# Patient Record
Sex: FEMALE | Race: WHITE | NOT HISPANIC OR LATINO | Employment: OTHER | URBAN - METROPOLITAN AREA
[De-identification: names, ages, dates, MRNs, and addresses within clinical notes are randomized per-mention and may not be internally consistent; named-entity substitution may affect disease eponyms.]

---

## 2024-08-23 ENCOUNTER — HOSPITAL ENCOUNTER (EMERGENCY)
Facility: CLINIC | Age: 74
Discharge: HOME | End: 2024-08-23
Attending: EMERGENCY MEDICINE | Admitting: EMERGENCY MEDICINE
Payer: COMMERCIAL

## 2024-08-23 VITALS
TEMPERATURE: 99.6 F | DIASTOLIC BLOOD PRESSURE: 96 MMHG | OXYGEN SATURATION: 97 % | RESPIRATION RATE: 18 BRPM | HEIGHT: 62 IN | SYSTOLIC BLOOD PRESSURE: 187 MMHG | BODY MASS INDEX: 23.92 KG/M2 | WEIGHT: 130 LBS | HEART RATE: 95 BPM

## 2024-08-23 DIAGNOSIS — K57.92 ACUTE DIVERTICULITIS: ICD-10-CM

## 2024-08-23 PROCEDURE — 99283 EMERGENCY DEPT VISIT LOW MDM: CPT

## 2024-08-23 PROCEDURE — 250N000013 HC RX MED GY IP 250 OP 250 PS 637: Performed by: EMERGENCY MEDICINE

## 2024-08-23 RX ADMIN — AMOXICILLIN AND CLAVULANATE POTASSIUM 1 TABLET: 875; 125 TABLET, FILM COATED ORAL at 21:53

## 2024-08-23 ASSESSMENT — COLUMBIA-SUICIDE SEVERITY RATING SCALE - C-SSRS
2. HAVE YOU ACTUALLY HAD ANY THOUGHTS OF KILLING YOURSELF IN THE PAST MONTH?: NO
6. HAVE YOU EVER DONE ANYTHING, STARTED TO DO ANYTHING, OR PREPARED TO DO ANYTHING TO END YOUR LIFE?: NO
1. IN THE PAST MONTH, HAVE YOU WISHED YOU WERE DEAD OR WISHED YOU COULD GO TO SLEEP AND NOT WAKE UP?: NO

## 2024-08-24 NOTE — ED TRIAGE NOTES
Patient arrives to the ER with c/o abdominal pain. Has a history of diverticulitis, and states this feels like that.      Triage Assessment (Adult)       Row Name 08/23/24 5397          Triage Assessment    Airway WDL WDL        Respiratory WDL    Respiratory WDL WDL        Skin Circulation/Temperature WDL    Skin Circulation/Temperature WDL WDL        Cardiac WDL    Cardiac WDL WDL        Peripheral/Neurovascular WDL    Peripheral Neurovascular WDL WDL        Cognitive/Neuro/Behavioral WDL    Cognitive/Neuro/Behavioral WDL WDL

## 2024-08-24 NOTE — ED PROVIDER NOTES
EMERGENCY DEPARTMENT ENCOUnter      NAME: Sharon Carrillo  AGE: 73 year old female  YOB: 1950  MRN: [id number]  EVALUATION DATE & TIME: No admission date for patient encounter.    PCP: Delmi Arroyo    ED PROVIDER: Aisha Joseph MD      No chief complaint on file.        FINAL IMPRESSION:  1. Acute diverticulitis          ED COURSE & MEDICAL DECISION MAKING:      In summary, the patient is a 73-year-old female who presents to the emergency department for evaluation of low abdominal pain thought secondary to diverticulitis.  The patient has had multiple episodes of diverticulitis and states she knows what it feels like.  She would prefer not to have an extensive workup in the emergency department and would like to try antibiotics, which is reasonable.  Recommended close outpatient follow-up with primary care.    2115-discussed evaluation with patient and she would like to decline labs and CT testing at this time.  She would like to try antibiotics to see if this helps her symptoms.  Augmentin 875 mg p.o. was administered.    Medical Decision Making  Obtained supplemental history:Supplemental history obtained?: No  Reviewed external records: External records reviewed?: No  Care impacted by chronic illness:Hypertension  Care significantly affected by social determinants of health:N/A  Did you consider but not order tests?: Work up considered but not performed and documented in chart, if applicable  Did you interpret images independently?: Independent interpretation of ECG and images noted in documentation, when applicable.  Consultation discussion with other provider:Did you involve another provider (consultant, MH, pharmacy, etc.)?: No  Discharge. I prescribed additional prescription strength medication(s) as charted. See documentation for any additional details.  No MIPS measures identified.        At the conclusion of the encounter I discussed the results of all of the tests and  the disposition. The questions were answered. The patient or family acknowledged understanding and was agreeable with the care plan.         MEDICATIONS GIVEN IN THE EMERGENCY:  Medications   amoxicillin-clavulanate (AUGMENTIN) 875-125 MG per tablet 1 tablet (has no administration in time range)       NEW PRESCRIPTIONS STARTED AT TODAY'S ER VISIT  New Prescriptions    AMOXICILLIN-CLAVULANATE (AUGMENTIN) 875-125 MG TABLET    Take 1 tablet by mouth 2 times daily for 7 days.          =================================================================    HPI        Sharon Carrillo is a 73 year old female with a pertinent history of diverticulitis presents to the emergency department for evaluation of low abdominal pain that started today.  She describes her pain as dull, 4 out of 10 in intensity, located in her low abdomen, and is not relieved or exacerbated by any particular activity.  She denies any nausea, vomiting, diarrhea, dysuria, urgency, or frequency.  She has not had any fevers or chills.      REVIEW OF SYSTEMS     Constitutional:  Denies fever or chills  HENT:  Denies sore throat   Respiratory:  Denies cough or shortness of breath   Cardiovascular:  Denies chest pain or palpitations  GI:  abdominal pain  Musculoskeletal:  Denies any new extremity pain   Skin:  Denies rash   Neurologic:  Denies headache, focal weakness or sensory changes    All other systems reviewed and are negative      PAST MEDICAL HISTORY:  Past Medical History:   Diagnosis Date    Breast cancer (H)     DJD (degenerative joint disease) of hip     Ganglion cyst     HTN (hypertension)     Hypercholesterolaemia        PAST SURGICAL HISTORY:  Past Surgical History:   Procedure Laterality Date    APPENDECTOMY      BREAST LUMPECTOMY, RT/LT      C TOTAL ABDOM HYSTERECTOMY      HC REMOVAL OF OVARIAN CYST(S)      REPAIR TENDON WRIST  5/28/2014    Procedure: REPAIR TENDON WRIST;  Surgeon: Delmi Clayton MD;  Location: [facility]     SALPINGO-OOPHORECTOMY BILATERAL             CURRENT MEDICATIONS:    amoxicillin-clavulanate (AUGMENTIN) 875-125 MG tablet  aspirin 81 MG EC tablet  Atenolol (TENORMIN PO)  Biotin 10 MG TABS  calcium carbonate (TUMS) 500 MG chewable tablet  glucosamine-chondroitin 500-400 MG CAPS  HYDROcodone-acetaminophen (NORCO) 5-325 MG per tablet  Multiple Vitamin (MULTIVITAMINS PO)  Omega-3 Fatty Acids (OMEGA-3 FISH OIL PO)  SIMVASTATIN PO  VITAMIN D, CHOLECALCIFEROL, PO        ALLERGIES:  Allergies   Allergen Reactions    Sulfa Antibiotics Hives       FAMILY HISTORY:  No family history on file.    SOCIAL HISTORY:   Social History     Socioeconomic History    Marital status:    Tobacco Use    Smoking status: Never   Substance and Sexual Activity    Alcohol use: Yes    Drug use: No     Social Determinants of Health     Financial Resource Strain: Low Risk  (6/26/2023)    Received from [facility]    Financial Resource Strain     Difficulty of Paying Living Expenses: 3   Food Insecurity: No Food Insecurity (6/26/2023)    Received from [facility]    Food Insecurity     Worried About Running Out of Food in the Last Year: 1   Transportation Needs: No Transportation Needs (6/26/2023)    Received from [facility]    Transportation Needs     Lack of Transportation (Medical): 1   Social Connections: Socially Integrated (6/26/2023)    Received from [facility]    Social Connections     Frequency of Communication with Friends and Family: 0   Housing Stability: Low Risk  (7/24/2023)    Received from [facility]    Housing Stability     Unable to Pay for Housing in the Last Year: 1         PHYSICAL EXAM    Constitutional:  Well developed, Well nourished,  HENT:  Normocephalic, Atraumatic, Bilateral external ears normal, Oropharynx moist, Nose normal.   Neck:  Normal range  of motion, No meningismus, No stridor.   Eyes:  EOMI, Conjunctiva normal, No discharge.   Respiratory:  Normal breath sounds, No respiratory distress, No wheezing, No chest tenderness.   Cardiovascular:  Normal heart rate, Normal rhythm, No murmurs  GI:  Soft, mild lop abdominal tenderness, No guarding,   Musculoskeletal:  Neurovascularly intact distally, No edema, No tenderness, No cyanosis, Good range of motion in all major joints.   Integument:  Warm, Dry, No erythema, No rash.   Lymphatic:  No lymphadenopathy noted.   Neurologic:  Alert & oriented , Normal motor function,  No focal deficits noted.   Psychiatric:  Affect normal, Judgment normal, Mood normal.        Aisha Joseph MD  Emergency Medicine  [facility] EMERGENCY ROOM  [address] [address] [phone]  Dept: [phone]     Aisha Joseph MD  08/23/24 0446

## 2024-08-24 NOTE — DISCHARGE INSTRUCTIONS
Tylenol 650 mg every 4 hours as needed for pain or fever  Ibuprofen 600 mg every 6 hours as needed for pain or fever  Dramamine as needed for nausea or vomiting  Return to the emergency department for worsening problems or concerns  Your next dose of antibiotics will be tomorrow morning

## 2024-09-15 ENCOUNTER — HOSPITAL ENCOUNTER (EMERGENCY)
Facility: CLINIC | Age: 74
Discharge: HOME | End: 2024-09-15
Attending: EMERGENCY MEDICINE | Admitting: EMERGENCY MEDICINE
Payer: COMMERCIAL

## 2024-09-15 VITALS
DIASTOLIC BLOOD PRESSURE: 88 MMHG | HEART RATE: 91 BPM | SYSTOLIC BLOOD PRESSURE: 191 MMHG | RESPIRATION RATE: 18 BRPM | OXYGEN SATURATION: 98 % | TEMPERATURE: 99.5 F

## 2024-09-15 DIAGNOSIS — K57.92 DIVERTICULITIS: ICD-10-CM

## 2024-09-15 DIAGNOSIS — I10 HYPERTENSION, UNSPECIFIED TYPE: ICD-10-CM

## 2024-09-15 PROBLEM — I82.402 DEEP VEIN THROMBOSIS (DVT) OF LEFT LOWER EXTREMITY (H): Status: ACTIVE | Noted: 2023-06-26

## 2024-09-15 PROBLEM — I25.10 NON-OCCLUSIVE CORONARY ARTERY DISEASE: Status: ACTIVE | Noted: 2023-06-26

## 2024-09-15 PROCEDURE — 250N000013 HC RX MED GY IP 250 OP 250 PS 637: Performed by: EMERGENCY MEDICINE

## 2024-09-15 PROCEDURE — 99283 EMERGENCY DEPT VISIT LOW MDM: CPT

## 2024-09-15 RX ADMIN — AMOXICILLIN AND CLAVULANATE POTASSIUM 1 TABLET: 875; 125 TABLET, FILM COATED ORAL at 20:31

## 2024-09-15 ASSESSMENT — COLUMBIA-SUICIDE SEVERITY RATING SCALE - C-SSRS
6. HAVE YOU EVER DONE ANYTHING, STARTED TO DO ANYTHING, OR PREPARED TO DO ANYTHING TO END YOUR LIFE?: NO
2. HAVE YOU ACTUALLY HAD ANY THOUGHTS OF KILLING YOURSELF IN THE PAST MONTH?: NO
1. IN THE PAST MONTH, HAVE YOU WISHED YOU WERE DEAD OR WISHED YOU COULD GO TO SLEEP AND NOT WAKE UP?: NO

## 2024-09-16 NOTE — ED TRIAGE NOTES
Pt presents to the ED with c/o lower abdominal pain. Pt reports hx of diverticulitis, states that this feels the same. Denies fevers at home. Last BM this morning. Denies N/V/D. Tim CARDONA assessed patient in triage.

## 2024-09-16 NOTE — DISCHARGE INSTRUCTIONS
Take antibiotics as prescribed.  Given recent Augmentin if you are not getting improvement would consider switching to Cipro/Flagyl.  Your blood pressure was elevated today, monitor blood pressure at home and follow-up with PCP for blood pressure check.

## 2024-09-16 NOTE — ED PROVIDER NOTES
EMERGENCY DEPARTMENT ENCOUNTER      NAME: Sharon Carrillo  AGE: 73 year old female  YOB: 1950  MRN: [id number]  EVALUATION DATE & TIME: 9/15/2024  8:20 PM    PCP: Delmi Arroyo    ED PROVIDER: Areli Nguyen MD    Chief Complaint   Patient presents with    Abdominal Pain         FINAL IMPRESSION:  1. Diverticulitis    2. Hypertension, unspecified type          ED COURSE & MEDICAL DECISION MAKING:    Pertinent Labs & Imaging studies reviewed. (See chart for details)  73 year old female with history of HTN, HLD, nonocclusive CAD, previous DVT and recurrent diverticulitis who presents to the Emergency Department for evaluation of mild lower abdominal discomfort that started this morning reminiscent of previous episodes of diverticulitis.  Abdomen is benign on my examination.  Patient has not had any fevers, chills, nausea vomiting.  Bowel movements have been normal and she has never had issues with complicated diverticulitis such as perforation, abscess, need for hospitalization, excetra previously.  Discussed imaging and laboratory testing to confirm diagnosis, but in lieu of this being very similar to her previous episodes of diverticulitis patient wishes to forego and lieu of antibiotic treatment alone which I think is reasonable.  Was recently on Augmentin within the last 3 to 4 weeks so discussed using Cipro and Flagyl but she seems to historically responded to Augmentin well so we will give her a longer course of Augmentin but encouraged to follow-up with PCP in 24 to 48 hours for consideration of adjusting antibiotic regimen if she is not getting relief in her symptoms.  Blood pressure elevated here, history of same.  States blood pressure is typically well-controlled when she checks it at home, but has some degree of whitecoat hypertension.  Will encouraged to check blood pressure at home and follow-up with PCP for blood pressure check.      ED Course as of 09/15/24 2100   Sun Sep 15,  2024 2011 I met with the patient, obtained history, performed an initial exam, and discussed options and plan for diagnostics and treatment here in the ED.     2030 We discussed the plan for discharge and the patient is agreeable. Reviewed supportive cares, symptomatic treatment, outpatient follow up, and reasons to return to the Emergency Department. Patient to be discharged by ED RN.          Medical Decision Making    History:  Supplemental history from: Documented in chart and N/A  External Record(s) reviewed: 8/29/2024 office visit    Work Up:  Chart documentation includes differential considered and any EKGs or imaging independently interpreted by provider, see MDM  In additional to work up documented, I considered the following work up: see MDM    External consultation:  Discussion of management with another provider: N/A    Complicating factors:  Care impacted by chronic illness: HTN, HLD, CAD  Care affected by social determinants of health: Access to Medical Care weekend no access PCP    Disposition considerations: Discharge. I prescribed additional prescription strength medication(s) as charted. See documentation for any additional details.    Not Applicable      At the conclusion of the encounter I discussed the results of all of the tests and the disposition. The questions were answered. The patient or family acknowledged understanding and was agreeable with the care plan.    MEDICATIONS GIVEN IN THE EMERGENCY:  Medications   amoxicillin-clavulanate (AUGMENTIN) 875-125 MG per tablet 1 tablet (1 tablet Oral $Given 9/15/24 2031)       NEW PRESCRIPTIONS STARTED AT TODAY'S ER VISIT  Discharge Medication List as of 9/15/2024  8:31 PM        START taking these medications    Details   amoxicillin-clavulanate (AUGMENTIN) 875-125 MG tablet Take 1 tablet by mouth 2 times daily for 14 days., Disp-28 tablet, R-0, Local Print             "    =================================================================    HPI    Patient information was obtained from: patient     Use of Intrepreter: N/A      Sharon Carrillo is a 73 year old female with pertinent medical history of hypertension, diverticulitis who presents abdominal pain.    Patient came to ED after feeling lower abdominal pain that she believes is related to her diverticulitis. She has a reported fever and has \"heavy feelings\" in her abdomen. She reports feeling very bloated and is passing gas. Her last bowel movement was this morning. She has had many episode like this in the past including three weeks ago when she came into the ED and was prescribed Augmentin for one week. Her last colonoscopy was last month. They found one benign polyp but otherwise unremarkable. She reports her blood pressure is usually around 120/70 but it gets higher when she is in hospitals. Patient denies any dysuria, hematuria, or history of complicated diverticulitis.    Patient wishes for her prescription to be sent to [facility] in [facility].     Per Chart Review: 8/23/24 [facility] ED visit for abdominal pain. Patient came in having abdominal pain that feels exactly like her diverticulitis acting up. Augmentin 875 mg was administered and she was discharged same day.      PAST MEDICAL HISTORY:  Past Medical History:   Diagnosis Date    Breast cancer (H)     DJD (degenerative joint disease) of hip     Ganglion cyst     HTN (hypertension)     Hypercholesterolaemia        PAST SURGICAL HISTORY:  Past Surgical History:   Procedure Laterality Date    APPENDECTOMY      BREAST LUMPECTOMY, RT/LT      C TOTAL ABDOM HYSTERECTOMY      HC REMOVAL OF OVARIAN CYST(S)      REPAIR TENDON WRIST  5/28/2014    Procedure: REPAIR TENDON WRIST;  Surgeon: Delmi Clayton MD;  Location: [facility]    SALPINGO-OOPHORECTOMY BILATERAL         CURRENT MEDICATIONS:    Prior to Admission Medications   Prescriptions Last Dose Informant " Patient Reported? Taking?   Atenolol (TENORMIN PO)   Yes No   Sig: Take 50 mg by mouth daily   Biotin 10 MG TABS   Yes No   Sig: Take 1 tablet by mouth daily   HYDROcodone-acetaminophen (NORCO) 5-325 MG per tablet   No No   Sig: Take 1-2 tablets every 4-6 hours for pain if needed.   Multiple Vitamin (MULTIVITAMINS PO)   Yes No   Sig: Take 1 tablet by mouth daily   Omega-3 Fatty Acids (OMEGA-3 FISH OIL PO)   Yes No   Sig: Take 1 g by mouth daily   SIMVASTATIN PO   Yes No   Sig: Take 10 mg by mouth At Bedtime    VITAMIN D, CHOLECALCIFEROL, PO   Yes No   Sig: Take 1,000 Units by mouth daily   aspirin 81 MG EC tablet   Yes No   Sig: Take 81 mg by mouth daily   calcium carbonate (TUMS) 500 MG chewable tablet   Yes No   Sig: Take 1 chew tab by mouth daily   glucosamine-chondroitin 500-400 MG CAPS   Yes No   Sig: Take 1 capsule by mouth daily      Facility-Administered Medications: None       ALLERGIES:  Allergies   Allergen Reactions    Sulfa Antibiotics Hives       FAMILY HISTORY:  No family history on file.    SOCIAL HISTORY:  Social History     Tobacco Use    Smoking status: Never   Substance Use Topics    Alcohol use: Yes    Drug use: No        VITALS:  Patient Vitals for the past 24 hrs:   BP Temp Temp src Pulse Resp SpO2   09/15/24 2031 (!) 191/88 -- -- -- -- --   09/15/24 2017 (!) 197/104 -- -- 91 -- 98 %   09/15/24 2016 (!) 223/112 -- -- -- -- --   09/15/24 2013 -- 99.5  F (37.5  C) Temporal 91 18 98 %       PHYSICAL EXAM    General Appearance: Well-appearing, well-nourished, no acute distress  Cardio:  Regular rate and rhythm,  hypertensive  Pulm:  No respiratory distress  Back: No CVA tenderness, normal ROM  Abdomen:  Soft, non-tender, non distended,no rebound or guarding, cannot reproduce pain.  Extremities: Normal gait  Neuro:  Alert and oriented ×3     RADIOLOGY/LABS:  Reviewed all pertinent imaging. Please see official radiology report. All pertinent labs reviewed and interpreted.           The creation of  this record is based on the scribe s observations of the work being performed by Areli Nguyen MD and the provider s statements to them. It was created on her behalf by Kelly Nguyen, a trained medical scribe. This document has been checked and approved by the attending provider.    Areli Nguyen MD  Emergency Medicine  [facility] EMERGENCY ROOM  [address] [id number] [phone]  Dept: [phone]       Areli Nguyen MD  09/15/24 2328